# Patient Record
Sex: FEMALE | Race: BLACK OR AFRICAN AMERICAN | NOT HISPANIC OR LATINO
[De-identification: names, ages, dates, MRNs, and addresses within clinical notes are randomized per-mention and may not be internally consistent; named-entity substitution may affect disease eponyms.]

---

## 2018-06-05 PROBLEM — Z00.00 ENCOUNTER FOR PREVENTIVE HEALTH EXAMINATION: Status: ACTIVE | Noted: 2018-06-05

## 2018-06-06 ENCOUNTER — APPOINTMENT (OUTPATIENT)
Dept: ORTHOPEDIC SURGERY | Facility: CLINIC | Age: 33
End: 2018-06-06

## 2018-06-08 ENCOUNTER — EMERGENCY (EMERGENCY)
Facility: HOSPITAL | Age: 33
LOS: 1 days | Discharge: HOME | End: 2018-06-08
Admitting: EMERGENCY MEDICINE
Payer: COMMERCIAL

## 2018-06-08 VITALS
OXYGEN SATURATION: 100 % | RESPIRATION RATE: 18 BRPM | HEART RATE: 78 BPM | SYSTOLIC BLOOD PRESSURE: 117 MMHG | DIASTOLIC BLOOD PRESSURE: 78 MMHG | TEMPERATURE: 98 F

## 2018-06-08 VITALS
OXYGEN SATURATION: 100 % | RESPIRATION RATE: 18 BRPM | SYSTOLIC BLOOD PRESSURE: 118 MMHG | DIASTOLIC BLOOD PRESSURE: 68 MMHG | TEMPERATURE: 98 F | HEART RATE: 77 BPM

## 2018-06-08 LAB — HCG UR QL: NEGATIVE — SIGNIFICANT CHANGE UP

## 2018-06-08 PROCEDURE — 99284 EMERGENCY DEPT VISIT MOD MDM: CPT | Mod: 25

## 2018-06-08 PROCEDURE — 73502 X-RAY EXAM HIP UNI 2-3 VIEWS: CPT | Mod: 26,RT

## 2018-06-08 PROCEDURE — 93971 EXTREMITY STUDY: CPT | Mod: 26,RT

## 2018-06-08 PROCEDURE — 73502 X-RAY EXAM HIP UNI 2-3 VIEWS: CPT

## 2018-06-08 PROCEDURE — 72192 CT PELVIS W/O DYE: CPT

## 2018-06-08 PROCEDURE — 93971 EXTREMITY STUDY: CPT

## 2018-06-08 PROCEDURE — 72192 CT PELVIS W/O DYE: CPT | Mod: 26

## 2018-06-08 PROCEDURE — 81025 URINE PREGNANCY TEST: CPT

## 2018-06-08 RX ORDER — IBUPROFEN 200 MG
600 TABLET ORAL ONCE
Qty: 0 | Refills: 0 | Status: COMPLETED | OUTPATIENT
Start: 2018-06-08 | End: 2018-06-08

## 2018-06-08 RX ORDER — OXYCODONE AND ACETAMINOPHEN 5; 325 MG/1; MG/1
1 TABLET ORAL ONCE
Qty: 0 | Refills: 0 | Status: DISCONTINUED | OUTPATIENT
Start: 2018-06-08 | End: 2018-06-08

## 2018-06-08 RX ADMIN — Medication 600 MILLIGRAM(S): at 18:14

## 2018-06-08 RX ADMIN — OXYCODONE AND ACETAMINOPHEN 1 TABLET(S): 5; 325 TABLET ORAL at 20:19

## 2018-06-08 NOTE — ED PROVIDER NOTE - DIAGNOSTIC INTERPRETATION
Interpreted by ED FERN STOUT hip with pelvis 3 views  No fracture, no dislocation (joint spaces grossly normal), no Foreign Body noted, soft tissue normal

## 2018-06-08 NOTE — ED ADULT TRIAGE NOTE - CHIEF COMPLAINT QUOTE
patient sent from [facility] for right sided hip pain/  right chin pain. denies trauma or injury. patient sent from [facility] for right sided hip pain. denies trauma or injury but states works out a lot.

## 2018-06-08 NOTE — ED PROVIDER NOTE - MEDICAL DECISION MAKING DETAILS
R hip pain x 1 week, atraumatic, athletic, no neuro/motor deficits, ambulatory, CT shows no fx or dislocation, possible strain, will rx pain meds, rest, f/u ortho

## 2018-06-08 NOTE — ED PROVIDER NOTE - OBJECTIVE STATEMENT
33y F with recent strep throat (finished z pack 3 weeks ago) presents to ED for right hip pain. Pt states pain started 1 week ago and has been occurring on and off since then. She saw her PMD 3 days ago, who referred her to an orthopedist (has appointment for 6/14). Pt now with additional pain in her right lateral calf. States she is able to walk, with some pain. 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 Denies recent fall or trauma. 33y F with recent strep throat (finished z pack 3 weeks ago) presents to ED for right hip pain. Pt states pain started 1 week ago and has been occurring on and off since then. She saw her PMD 3 days ago, who referred her to an orthopedist (has appointment for 6/14). Pt now with additional pain in her right lateral calf. States she is able to walk, with some pain. 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 Denies recent fall or trauma. 33y F with recent strep throat (finished z pack 3 weeks ago) presents to ED for right hip pain. Pt states pain started 1 week ago and has been occurring on and off since then. She saw her PMD 3 days ago, who referred her to an orthopedist (has appointment for 6/14). Pt now with additional pain in her right lateral calf. States she is able to walk. 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 Denies recent fall or trauma. 33y F with presents to ED for right hip pain. Pt states pain started 1 week ago and has been occurring on and off since then. She saw her PMD 3 days ago, who referred her to an orthopedist (has appointment for 6/15). Pt now with additional pain in her right lateral shin.States she is able to walk. Pt is a longtime athlete/very active. Denies recent fall or trauma.

## 2018-06-08 NOTE — ED PROVIDER NOTE - MUSCULOSKELETAL, MLM
Bilateral hips: normal appearance. Mild tenderness to the right lateral hip. Pain with flexion of the hip. +anterior right shin tenderness. Distal pulses intact. Pt is ambulatory. Pelvis: normal appearance. Mild tenderness to the right lateral hip. Pain with flexion of the right hip. +anterior right shin tenderness. no calf tenderness. Distal pulses intact. no sensory or motor deficits. Pt is ambulatory.

## 2018-06-11 ENCOUNTER — APPOINTMENT (OUTPATIENT)
Dept: ORTHOPEDIC SURGERY | Facility: CLINIC | Age: 33
End: 2018-06-11

## 2018-06-12 DIAGNOSIS — M25.551 PAIN IN RIGHT HIP: ICD-10-CM

## 2018-06-12 DIAGNOSIS — M79.661 PAIN IN RIGHT LOWER LEG: ICD-10-CM

## 2018-06-13 ENCOUNTER — APPOINTMENT (OUTPATIENT)
Dept: ORTHOPEDIC SURGERY | Facility: CLINIC | Age: 33
End: 2018-06-13

## 2018-06-27 ENCOUNTER — APPOINTMENT (OUTPATIENT)
Dept: ORTHOPEDIC SURGERY | Facility: CLINIC | Age: 33
End: 2018-06-27

## 2023-06-18 NOTE — ED PROVIDER NOTE - TEMPLATE
Pts daughter stated her mom has had a dry non productive cough for about a week that has progressively gotten worse so she went to the [facility] this evening and they told them to come here due to XRAY and pt needing stabilization. Pt denies any coughing up blood or any recent falls . Pt is on Eliquis and took last dose at 2100 today.   Orthopedic